# Patient Record
(demographics unavailable — no encounter records)

---

## 2024-10-28 NOTE — SOCIAL HISTORY
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 Pt required 5 month NICU stay with multiple subsequent surgeries for retina/septal defect. Pt did not meet her milestones on time, walking independently at 6yo, and requiring a walker until then, and requiring speech/OT/PT since newborndom. Pt's twin sister has no h/o developmental delays/intellectual disability/neurocognitive disorders [No Known Substance Use] : no known substance use

## 2024-10-28 NOTE — RISK ASSESSMENT
[Unable to determine level of acute suicide risk] : Unable to determine level of acute suicide risk [Unable to determine] : Unable to determine [Not clinically indicated] : Safety Plan completed/updated (for individuals at risk): Not clinically indicated

## 2024-10-28 NOTE — REASON FOR VISIT
[Patient preference] : as per patient preference [Continuing, patient not seen in-person within last 12 months (provide details below)] : Telehealth services are continuing, patient not seen in-person within last 12 months.  [Telehealth (audio & video) - Individual/Group] : This visit was provided via telehealth using real-time 2-way audio visual technology. [Medical Office: ([facility])___] : The provider was located at the medical office in [unfilled]. [Home] : The patient, [unfilled], was located at home, [unfilled], at the time of the visit. [Verbal consent obtained from patient/other participant(s)] : Verbal consent for telehealth/telephonic services obtained from patient/other participant(s) [FreeTextEntry4] : 7268 [FreeTextEntry5] : 2311 [FreeTextEntry2] : 7/20/23 [Patient with collateral] : Patient with collateral  [Mother] : mother [FreeTextEntry1] : Follow-up CC: "..."  The patient presented for OPD follow-up via telehealth, accompanied by her mother, Karlie.

## 2024-10-28 NOTE — PAST MEDICAL HISTORY
[FreeTextEntry1] : -Paradoxical disinhibition (aggression, laughing) with Ativan; \par  -Zyprexa worsened stereotypies as per mom as well as Abilify at higher doses (patient w/ good response to Abilify at lower dose).  \par  -Remeron was helpful initially for appetite stimulation however lost efficacy \par  -Cyproheptadine Rx'ed by Nutritionist also lost efficacy after several weeks and lead to increased sedation and other times increase in agitation. \par  -Risperidone 0.5 mg PO BID resulted in poor symptom response over time, drooling and upward gaze.

## 2024-10-28 NOTE — HISTORY OF PRESENT ILLNESS
[FreeTextEntry1] : Catrina is a 23-year-old [place] American female currently residing with parents at home in [place], [place].  Catrina has PPHx of ASD and Moderate IDD.  She also has PMHx of Cerebral Palsy and is currently on Risperdal 1 mg QAM + 2 mg QHS and Thorazine 25 mg Q6H PRN for agitation.  Catrina's mother, Karlie, is highly involved in her care and advocates for her extensively.  Catrina presents today for follow-up appointment accompanied by her mother.    Catrina's mother reports continued good response to Risperidone as Catrina has now been on the medication for 231 days.  Her behavior has been controlled and she has not required PRN Thorazine in >2 months.  She questions when to give Catrina her medication and she was advised to give doses 12 hours apart to promote consistent serum levels.  Catrina's mother has consulted their OB/GYN, Dr. Caban, about Progesterone birth control formulations to suppress menstruation, but he is not in support of a depot or SQ formulation.  Catrina's weight has remained constant and is still 105 lbs (same as last appointment).  Labs were reviewed from September and Catrina is noted to have hypercholesterolemia (LDL = 186 mg/dL, up from 151 mg/dL in May 2024).  Counseling was provided about Statin medications since Catrina's cholesterol continues to increase despite a healthy diet and lifestyle changes implemented by her mother.  Sialorrhea remains persistent, but Catrina's mother is reluctant to use Atropine drops as directed.  She was again educated on how to use the medication and associated AEs.  She informs Catrina is going to begin at-home speech therapy hopefully within the next 2 weeks.  She has been using 5 mg Melatonin QHS for sleep initiation.  On assessment, Catrina appears well.  She is calm, behaviorally controlled, and smiling.  She waves at the camera, but remains non-verbal during assessment.  Catrina's mother denies any behavioral or safety concerns at this time. [Yes] : yes [Unable to Assess] : unable to assess

## 2024-10-28 NOTE — PLAN
[Yes. details: ___] : Yes, [unfilled] [Medication education provided] : Medication education provided. [Rationale for medication choices, possible risks/precautions, benefits, alternative treatment choices, and consequences of non-treatment discussed] : Rationale for medication choices, possible risks/precautions, benefits, alternative treatment choices, and consequences of non-treatment discussed with patient/family/caregiver  [FreeTextEntry5] : RTC 1 Month  -c/w Risperidone 1 mg QAM + 2 mg QHS -c/w Thorazine 25 mg Q6H PRN - Agitation -c/w Atropine 1% drops SL Q2-4 hours PRN - Sialorrhea  Medication side effect profile reviewed and discussed including tachycardia, dry mouth, constipation, increased appetite, weight gain, sedation, dizziness, change in urinary function, and hypotension.  The possibility of elevated prolactin, galactorrhea, and amenorrhea was discussed with patient's mother.  She acknowledges and demonstrates understanding.

## 2024-10-28 NOTE — DISCUSSION/SUMMARY
[FreeTextEntry1] : Catrina Mart is a 23-year-old female, domiciled at home with mother, father, with pmhx/pphx significant for ASD and cerebral palsy, intellectual disability, no prior IPP admissions or suicide attempts, who presents to [facility] for follow up evaluation.  On intake, pt presented with increasing and more dangerous behavioral outbursts, low frustration tolerance and mood lability/dysregulation and restricted eating.  At time of intake, patient was on Risperidone 0.5 mg qhs, which over time was titrated to 1mg PO BID due to seemingly good response, however, ultimate loss of efficacy and side effects (lethargy, drooling) and thus cross titration w/ Abilify was started due to parents advising some good response to this medication in the past (though sx of EPS when dose reached 10mg).    Due to ongoing agitated and aggressive behavior, Abilify was titrated to 15 mg daily with PRN Thorazine 25 mg Q6H PRN for breakthrough agitation and continued behavioral interventions, which were emphasized to family again.  Given limited improvement with past medication trials, and current significant improvement, it's likely that her social environment has had an impact on her change of status for the positive and will thus continue current interventions.    Patient struggled with weight loss and poor food intake and she was started on Mirtazapine for her weight and anxiolytic benefits.  Megestrol was d/c'ed in the past (2/2 interaction with OCP and blood clot risk). Patient remains on birth control for h/o PMDD-like traits.   Mood stabilizer benefits for treatment options (VPA in particular) were considered and discussed with parents in the past. Risks and benefits of medication, alternatives and option of no medication discussed.  Inconsolable agitation and physical aggression ultimately became an issue and patient was transitioned back to Risperidone 1 mg QAM + 2 mg QHS with good effect.  Mirtazapine was down-tapered and discontinued.  Will continue to guide pharmacotherapy moving forward with use of [employer] reports, which have been scanned into the EMR.

## 2024-10-28 NOTE — PHYSICAL EXAM
[None] : none [FreeTextEntry2] : unable to assess [FreeTextEntry4] : unable to assess [Well groomed] : well groomed [Average] : average [Cooperative] : cooperative [Full] : full [WNL] : within normal limits [MR] : MR [FreeTextEntry8] : "..." [de-identified] : mute [FreeTextEntry6] : Unable to assess [FreeTextEntry7] : Unable to assess [de-identified] : Intellectually disabled [de-identified] : Unable to assess [de-identified] : Unable to assess

## 2024-12-02 NOTE — SOCIAL HISTORY
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 Pt required 5 month NICU stay with multiple subsequent surgeries for retina/septal defect. Pt did not meet her milestones on time, walking independently at 6yo, and requiring a walker until then, and requiring speech/OT/PT since newborndom. Pt's twin sister has no h/o developmental delays/intellectual disability/neurocognitive disorders [No Known Substance Use] : no known substance use

## 2024-12-02 NOTE — REASON FOR VISIT
[Patient preference] : as per patient preference [Continuing, patient not seen in-person within last 12 months (provide details below)] : Telehealth services are continuing, patient not seen in-person within last 12 months.  [Telehealth (audio & video) - Individual/Group] : This visit was provided via telehealth using real-time 2-way audio visual technology. [Medical Office: ([facility])___] : The provider was located at the medical office in [unfilled]. [Home] : The patient, [unfilled], was located at home, [unfilled], at the time of the visit. [Verbal consent obtained from patient/other participant(s)] : Verbal consent for telehealth/telephonic services obtained from patient/other participant(s) [If not patient, verbal consent obtained from parent/guardian/caretaker (name, relationship) ___ with patient assenting] : Verbal consent for telehealth/telephonic services was obtained from parent/guardian/caretaker, [unfilled], with patient assenting. [FreeTextEntry4] : 9658 [FreeTextEntry5] : 8002 [FreeTextEntry2] : 7/20/23 [Patient with collateral] : Patient with collateral  [Mother] : mother [FreeTextEntry1] : Follow-up CC: "..."  The patient presented for OPD follow-up via telehealth, accompanied by her mother, Karlie.

## 2024-12-02 NOTE — HISTORY OF PRESENT ILLNESS
[FreeTextEntry1] : Catrina is a 23-year-old [place] American female currently residing with parents at home in [place], [place].  Catrina has PPHx of ASD and Moderate IDD.  She also has PMHx of Cerebral Palsy and is currently on Risperdal 1 mg QAM + 2 mg QHS and Thorazine 25 mg Q6H PRN for agitation.  Catrina's mother, Karlie, is highly involved in her care and advocates for her extensively.  Catrina presents today for follow-up appointment accompanied by her mother.    Catrina's mother informs that everything has been going well since her last appointment.  Risperdal has continued to show benefit in keeping Catrina calm and "out of crisis."  Catrina's weight at today's appointment is 107 lbs (up from 105 lbs previously) and her mother is pleased with her appetite and overall weight gain.  The topic of routine lab work was again discussed and the treatment team will continue to monitor Garys cholesterol and HbA1c on a quarterly basis during this 1st year of Risperidone therapy.    Upon assessment, Catrina appears well and friendly.  She is behaviorally controlled, smiling, and waves at the camera.  She does not speak during assessment, despite prompts and encouragement from her mother.  Collateral denies any behavioral or safety concerns at this time. [Yes] : yes [Unable to Assess] : unable to assess

## 2024-12-02 NOTE — PHYSICAL EXAM
[None] : none [FreeTextEntry2] : unable to assess [Well groomed] : well groomed [Average] : average [Cooperative] : cooperative [Full] : full [WNL] : within normal limits [MR] : MR [FreeTextEntry8] : "..." [de-identified] : mute [FreeTextEntry6] : Unable to assess [FreeTextEntry7] : Unable to assess [de-identified] : Intellectually disabled [de-identified] : Unable to assess [de-identified] : Unable to assess Siliq Counseling:  I discussed with the patient the risks of Siliq including but not limited to new or worsening depression, suicidal thoughts and behavior, immunosuppression, malignancy, posterior leukoencephalopathy syndrome, and serious infections.  The patient understands that monitoring is required including a PPD at baseline and must alert us or the primary physician if symptoms of infection or other concerning signs are noted. There is also a special program designed to monitor depression which is required with Siliq.

## 2024-12-02 NOTE — DISCUSSION/SUMMARY
[FreeTextEntry1] : Catrina Mart is a 23-year-old female, domiciled at home with mother, father, with pmhx/pphx significant for ASD and cerebral palsy, intellectual disability, no prior IPP admissions or suicide attempts, who presents to [facility] for follow up evaluation.  On intake, pt presented with increasing and more dangerous behavioral outbursts, low frustration tolerance and mood lability/dysregulation and restricted eating.  At time of intake, patient was on Risperidone 0.5 mg qhs, which over time was titrated to 1mg PO BID due to seemingly good response, however, ultimate loss of efficacy and side effects (lethargy, drooling) and thus cross titration w/ Abilify was started due to parents advising some good response to this medication in the past (though sx of EPS when dose reached 10mg).    Due to ongoing agitated and aggressive behavior, Abilify was titrated to 15 mg daily with PRN Thorazine 25 mg Q6H PRN for breakthrough agitation and continued behavioral interventions, which were emphasized to family again.  Given limited improvement with past medication trials, and current significant improvement, it's likely that her social environment has had an impact on her change of status for the positive and will thus continue current interventions.    Patient struggled with weight loss and poor food intake and she was started on Mirtazapine for her weight and anxiolytic benefits.  Megestrol was d/c'ed in the past (2/2 interaction with OCP and blood clot risk). Patient remains on birth control for h/o PMDD-like traits.   Mood stabilizer benefits for treatment options (VPA in particular) were considered and discussed with parents in the past. Risks and benefits of medication, alternatives and option of no medication discussed.  Inconsolable agitation and physical aggression ultimately became an issue and patient was transitioned back to Risperidone 1 mg QAM + 2 mg QHS with good effect.  Mirtazapine was down-tapered and discontinued.  Will continue to guide pharmacotherapy moving forward with use of [employer] reports, which have been scanned into the EMR.

## 2025-01-28 NOTE — DISCUSSION/SUMMARY
[FreeTextEntry1] : Catrina Mart is a 23-year-old female, domiciled at home with mother, father, with pmhx/pphx significant for ASD and cerebral palsy, intellectual disability, no prior IPP admissions or suicide attempts, who presents to [facility] for follow up evaluation.  On intake, pt presented with increasing and more dangerous behavioral outbursts, low frustration tolerance and mood lability/dysregulation and restricted eating.  At time of intake, patient was on Risperidone 0.5 mg qhs, which over time was titrated to 1mg PO BID due to seemingly good response, however, ultimate loss of efficacy and side effects (lethargy, drooling) and thus cross titration w/ Abilify was started due to parents advising some good response to this medication in the past (though sx of EPS when dose reached 10mg).    Due to ongoing agitated and aggressive behavior, Abilify was titrated to 15 mg daily with PRN Thorazine 25 mg Q6H PRN for breakthrough agitation and continued behavioral interventions, which were emphasized to family again.  Given limited improvement with past medication trials, and current significant improvement, it's likely that her social environment has had an impact on her change of status for the positive and will thus continue current interventions.    Patient struggled with weight loss and poor food intake and she was started on Mirtazapine for her weight and anxiolytic benefits.  Megestrol was d/c'ed in the past (2/2 interaction with OCP and blood clot risk). Patient remains on birth control for h/o PMDD-like traits.   Mood stabilizer benefits for treatment options (VPA in particular) were considered and discussed with parents in the past. Risks and benefits of medication, alternatives and option of no medication discussed.  Inconsolable agitation and physical aggression ultimately became an issue and patient was transitioned back to Risperidone 1 mg QAM + 2 mg QHS with good effect.  Mirtazapine was down-tapered and discontinued.  Will continue to guide pharmacotherapy moving forward with use of [employer] reports, which have been scanned into the EMR.

## 2025-01-28 NOTE — HISTORY OF PRESENT ILLNESS
[FreeTextEntry1] : Catrina is a 23-year-old Thai American female currently residing with parents at home in [place], [place].  Catrina has PPHx of ASD and Moderate IDD.  She also has PMHx of Cerebral Palsy and is currently on Risperdal 1 mg QAM + 2 mg QHS.  Catrina's mother, Karlie, is highly involved in her care and advocates for her extensively.  Catrina presents today for follow-up appointment accompanied by her mother.    Catrina's mother reports that everything has been going well since the last appointment. Risperdal continues to be effective in managing Catrina's behavior and preventing crises. Catrina's weight at today's visit is 102 lbs, a decrease from her previous weight of 107 lbs, which her mother attributes to a recent illness that kept her out of school for approximately 1.5 weeks. Routine lab work was reviewed with her mother, and the results will be uploaded to the EMR shortly. LDL and total cholesterol levels have improved, decreasing from 186 and 269 to 160 and 233, respectively. Catrina's HbA1c is 5.7%, which is borderline for diabetes, and will continue to be monitored.  During the assessment, Catrina appeared happy, smiling, laughing, and waving at the camera. She was behaviorally controlled and greeted the writer verbally. Collateral denies any behavioral or safety concerns at this time. [Yes] : yes [Unable to Assess] : unable to assess

## 2025-01-28 NOTE — REASON FOR VISIT
[Patient preference] : as per patient preference [Continuing, patient not seen in-person within last 12 months (provide details below)] : Telehealth services are continuing, patient not seen in-person within last 12 months.  [Telehealth (audio & video) - Individual/Group] : This visit was provided via telehealth using real-time 2-way audio visual technology. [Medical Office: ([facility])___] : The provider was located at the medical office in [unfilled]. [Home] : The patient, [unfilled], was located at home, [unfilled], at the time of the visit. [Patient's space is appropriate for telehealth and maintains privacy/confidentiality.] : Patient's space is appropriate for telehealth and maintains privacy/confidentiality. [Verbal consent obtained from patient/other participant(s)] : Verbal consent for telehealth/telephonic services obtained from patient/other participant(s) [If not patient, verbal consent obtained from parent/guardian/caretaker (name, relationship) ___ with patient assenting] : Verbal consent for telehealth/telephonic services was obtained from parent/guardian/caretaker, [unfilled], with patient assenting. [FreeTextEntry4] : 4346 [FreeTextEntry5] : 5309 [FreeTextEntry2] : 7/20/23 [Patient with collateral] : Patient with collateral  [Mother] : mother [FreeTextEntry1] : Follow-up CC: "Hi!"  The patient presented for OPD follow-up via telehealth, accompanied by her mother, Karlie.

## 2025-01-28 NOTE — SOCIAL HISTORY
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 Pt required 5 month NICU stay with multiple subsequent surgeries for retina/septal defect. Pt did not meet her milestones on time, walking independently at 4yo, and requiring a walker until then, and requiring speech/OT/PT since newborndom. Pt's twin sister has no h/o developmental delays/intellectual disability/neurocognitive disorders [No Known Substance Use] : no known substance use

## 2025-01-28 NOTE — PHYSICAL EXAM
[None] : none [FreeTextEntry2] : unable to assess [Well groomed] : well groomed [Average] : average [Cooperative] : cooperative [Full] : full [Loud] : loud [WNL] : within normal limits [MR] : MR [FreeTextEntry8] : Happy [FreeTextEntry6] : Unable to assess [FreeTextEntry7] : Unable to assess [de-identified] : Intellectually disabled [de-identified] : Unable to assess [de-identified] : Unable to assess

## 2025-03-04 NOTE — REASON FOR VISIT
[Patient preference] : as per patient preference [Continuing, patient not seen in-person within last 12 months (provide details below)] : Telehealth services are continuing, patient not seen in-person within last 12 months.  [Telehealth (audio & video) - Individual/Group] : This visit was provided via telehealth using real-time 2-way audio visual technology. [Medical Office: ([facility])___] : The provider was located at the medical office in [unfilled]. [Home] : The patient, [unfilled], was located at home, [unfilled], at the time of the visit. [Patient's space is appropriate for telehealth and maintains privacy/confidentiality.] : Patient's space is appropriate for telehealth and maintains privacy/confidentiality. [Participant(s) identity verified] : Participant(s) identity verified. [Verbal consent obtained from patient/other participant(s)] : Verbal consent for telehealth/telephonic services obtained from patient/other participant(s) [If not patient, verbal consent obtained from parent/guardian/caretaker (name, relationship) ___ with patient assenting] : Verbal consent for telehealth/telephonic services was obtained from parent/guardian/caretaker, [unfilled], with patient assenting. [FreeTextEntry4] : 1645 [FreeTextEntry5] : 2494 [FreeTextEntry2] : 7/20/23 [Patient with collateral] : Patient with collateral  [Mother] : mother [FreeTextEntry1] : Follow-up CC: "Today is day 355 of Risperidone for Catrina"  The patient presented for OPD follow-up via telehealth, accompanied by her mother, Karlie.

## 2025-03-04 NOTE — HISTORY OF PRESENT ILLNESS
[FreeTextEntry1] : Catrina is a 24-year-old [place] American female currently residing with parents at home in [place], [place].  Catrina has PPHx of ASD and Moderate IDD.  She also has PMHx of Cerebral Palsy and is currently on Risperdal 1 mg QAM + 2 mg QHS.  Catrina's mother, Karlie, is highly involved in her care and advocates for her extensively.  Catrina presents today for follow-up appointment accompanied by her mother.    Catrina's mother reports that she has been doing well since her last follow-up. Risperdal continues to effectively manage her behavior and prevent crises; however, her mother has observed that Catrina's behavior becomes less controlled around 14:00, as her medication is administered early in the morning upon waking. The option of transitioning to a long-acting injectable formulation for more consistent therapeutic coverage was discussed, but Catrina's mother prefers to continue with oral dosing at this time due to the invasive nature of the injection.    At today's visit, Catrina's weight was recorded at 100.5 lbs, a slight decrease from her previous weight of 102 lbs, which her mother attributes to a healthier diet. She also trialed Atropine drops for sialorrhea, which were effective but caused coughing as a side effect, leading to discontinuation of regular use.    Catrina's mother continues to report behavioral challenges related to Catrina's menstrual cycle. Birth control options to eliminate menses were discussed again, though her OB/GYN, Dr. Crespo, appears opposed to this approach. The writer will defer to Dr. Crespo for management of birth control.    Additionally, Catrina's mother shared that her  will be starting transplant and cancer treatment at [facility] in [facility] next month. During this time, she will be caring for him while Catrina's aunt assumes caregiving responsibilities. She was advised that Catrina may struggle with this transition, and potential medication adjustments can be considered if she has difficulty coping.    Catrina is expected to begin occupational therapy soon, though speech therapy has not yet been established.    On assessment, Catrina appeared happy, smiling, and laughing as she returned home from her daily program. She was behaviorally controlled but loud and excited. Collateral reports no current behavioral or safety concerns. [Yes] : yes [Unable to Assess] : unable to assess

## 2025-03-04 NOTE — SOCIAL HISTORY
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 Pt required 5 month NICU stay with multiple subsequent surgeries for retina/septal defect. Pt did not meet her milestones on time, walking independently at 6yo, and requiring a walker until then, and requiring speech/OT/PT since newborndom. Pt's twin sister has no h/o developmental delays/intellectual disability/neurocognitive disorders [No Known Substance Use] : no known substance use

## 2025-03-04 NOTE — PLAN
[Yes. details: ___] : Yes, [unfilled] [Medication education provided] : Medication education provided. [Rationale for medication choices, possible risks/precautions, benefits, alternative treatment choices, and consequences of non-treatment discussed] : Rationale for medication choices, possible risks/precautions, benefits, alternative treatment choices, and consequences of non-treatment discussed with patient/family/caregiver  [FreeTextEntry5] : RTC 6 Weeks  -c/w Risperidone 1 mg QAM + 2 mg QHS -c/w Atropine 1% drops SL Q2-4 hours PRN - Sialorrhea  Medication side effect profile reviewed and discussed including tachycardia, dry mouth, constipation, increased appetite, weight gain, sedation, dizziness, change in urinary function, and hypotension.  The possibility of elevated prolactin, galactorrhea, and amenorrhea was discussed with patient's mother.  She acknowledges and demonstrates understanding.

## 2025-03-04 NOTE — DISCUSSION/SUMMARY
[FreeTextEntry1] : Catrina Mart is a 23-year-old female, domiciled at home with mother, father, with pmhx/pphx significant for ASD and cerebral palsy, intellectual disability, no prior IPP admissions or suicide attempts, who presents to [facility] OPD for follow up evaluation.  On intake, pt presented with increasing and more dangerous behavioral outbursts, low frustration tolerance and mood lability/dysregulation and restricted eating.  At time of intake, patient was on Risperidone 0.5 mg qhs, which over time was titrated to 1mg PO BID due to seemingly good response, however, ultimate loss of efficacy and side effects (lethargy, drooling) and thus cross titration w/ Abilify was started due to parents advising some good response to this medication in the past (though sx of EPS when dose reached 10mg).    Due to ongoing agitated and aggressive behavior, Abilify was titrated to 15 mg daily with PRN Thorazine 25 mg Q6H PRN for breakthrough agitation and continued behavioral interventions, which were emphasized to family again.  Given limited improvement with past medication trials, and current significant improvement, it's likely that her social environment has had an impact on her change of status for the positive and will thus continue current interventions.    Patient struggled with weight loss and poor food intake and she was started on Mirtazapine for her weight and anxiolytic benefits.  Megestrol was d/c'ed in the past (2/2 interaction with OCP and blood clot risk). Patient remains on birth control for h/o PMDD-like traits.   Mood stabilizer benefits for treatment options (VPA in particular) were considered and discussed with parents in the past. Risks and benefits of medication, alternatives and option of no medication discussed.  Inconsolable agitation and physical aggression ultimately became an issue and patient was transitioned back to Risperidone 1 mg QAM + 2 mg QHS with good effect.  Mirtazapine was down-tapered and discontinued.  Will continue to guide pharmacotherapy moving forward with use of [employer] reports, which have been scanned into the EMR.

## 2025-03-04 NOTE — PHYSICAL EXAM
[None] : none [FreeTextEntry2] : unable to assess [Well groomed] : well groomed [Average] : average [Cooperative] : cooperative [Full] : full [Loud] : loud [WNL] : within normal limits [MR] : MR [FreeTextEntry8] : Happy [FreeTextEntry6] : Unable to assess [FreeTextEntry7] : Unable to assess [de-identified] : Intellectually disabled [de-identified] : Unable to assess [de-identified] : Unable to assess

## 2025-04-14 NOTE — PHYSICAL EXAM
[None] : none [FreeTextEntry2] : unable to assess [Well groomed] : well groomed [Average] : average [Cooperative] : cooperative [Full] : full [Loud] : loud [WNL] : within normal limits [MR] : MR [FreeTextEntry8] : Happy [FreeTextEntry6] : Unable to assess [FreeTextEntry7] : Unable to assess [de-identified] : Intellectually disabled [de-identified] : Unable to assess [de-identified] : Unable to assess

## 2025-04-14 NOTE — HISTORY OF PRESENT ILLNESS
[FreeTextEntry1] : Catrina is a 24-year-old Irish American female currently residing with parents at home in [place], [place].  Catrina has PPHx of ASD and Moderate IDD.  She also has PMHx of Cerebral Palsy and is currently on Risperdal 1 mg QAM + 2 mg QHS.  Catrina's mother, Karlie, is highly involved in her care and advocates for her extensively.  Catrina presents today for follow-up appointment accompanied by her mother.    Catrina's mother reports that Catrina has been doing well since her last follow-up. Risperdal pharmacotherapy continues to effectively manage her behavior and Catrina's mother is pleased at the response she has shown to the medication. At today's visit, Catrina's weight is back up to 102 lbs. She informs of an upcoming appointment with her OB/GYN, Dr. Crespo, with regards to Catrina's PMDD. She was also encouraged to retrial Atropine drops for sialorrhea and was instructed to take note of any cough that occurs. Catrina has begun to receive in-home occupational and physical therapy; however, speech therapy has not yet been established.    On assessment, Catrina presented happy, smiling, and laughing. She was behaviorally controlled and spoke with encouragement. Collateral reports no current behavioral or safety concerns at this time. [Yes] : yes [Unable to Assess] : unable to assess

## 2025-04-14 NOTE — DISCUSSION/SUMMARY
[FreeTextEntry1] : Catrina Mart is a 23-year-old female, domiciled at home with mother, father, with pmhx/pphx significant for ASD and cerebral palsy, intellectual disability, no prior IPP admissions or suicide attempts, who presents to [facility] OPD for follow up evaluation.  On intake, pt presented with increasing and more dangerous behavioral outbursts, low frustration tolerance and mood lability/dysregulation and restricted eating.  At time of intake, patient was on Risperidone 0.5 mg qhs, which over time was titrated to 1mg PO BID due to seemingly good response, however, ultimate loss of efficacy and side effects (lethargy, drooling) and thus cross titration w/ Abilify was started due to parents advising some good response to this medication in the past (though sx of EPS when dose reached 10mg).    Due to ongoing agitated and aggressive behavior, Abilify was titrated to 15 mg daily with PRN Thorazine 25 mg Q6H PRN for breakthrough agitation and continued behavioral interventions, which were emphasized to family again.  Given limited improvement with past medication trials, and current significant improvement, it's likely that her social environment has had an impact on her change of status for the positive and will thus continue current interventions.    Patient struggled with weight loss and poor food intake and she was started on Mirtazapine for her weight and anxiolytic benefits.  Megestrol was d/c'ed in the past (2/2 interaction with OCP and blood clot risk). Patient remains on birth control for h/o PMDD-like traits.   Mood stabilizer benefits for treatment options (VPA in particular) were considered and discussed with parents in the past. Risks and benefits of medication, alternatives and option of no medication discussed.  Inconsolable agitation and physical aggression ultimately became an issue and patient was transitioned back to Risperidone 1 mg QAM + 2 mg QHS with good effect.  Mirtazapine was down-tapered and discontinued.  Will continue to guide pharmacotherapy moving forward with use of [employer] reports, which have been scanned into the EMR.

## 2025-04-14 NOTE — REASON FOR VISIT
[Patient preference] : as per patient preference [Continuing, patient not seen in-person within last 12 months (provide details below)] : Telehealth services are continuing, patient not seen in-person within last 12 months.  [Telehealth (audio & video) - Individual/Group] : This visit was provided via telehealth using real-time 2-way audio visual technology. [Medical Office: ([facility])___] : The provider was located at the medical office in [unfilled]. [Home] : The patient, [unfilled], was located at home, [unfilled], at the time of the visit. [Patient's space is appropriate for telehealth and maintains privacy/confidentiality.] : Patient's space is appropriate for telehealth and maintains privacy/confidentiality. [Participant(s) identity verified] : Participant(s) identity verified. [Verbal consent obtained from patient/other participant(s)] : Verbal consent for telehealth/telephonic services obtained from patient/other participant(s) [If not patient, verbal consent obtained from parent/guardian/caretaker (name, relationship) ___ with patient assenting] : Verbal consent for telehealth/telephonic services was obtained from parent/guardian/caretaker, [unfilled], with patient assenting. [FreeTextEntry4] : 0183 [FreeTextEntry5] : 1433 [FreeTextEntry2] : 7/20/23 [Patient with collateral] : Patient with collateral  [Mother] : mother [FreeTextEntry1] : Follow-up CC: "Today is day 398 of Risperidone"  The patient presented for OPD follow-up via telehealth, accompanied by her mother, Karlie.

## 2025-04-14 NOTE — SOCIAL HISTORY
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 Pt required 5 month NICU stay with multiple subsequent surgeries for retina/septal defect. Pt did not meet her milestones on time, walking independently at 6yo, and requiring a walker until then, and requiring speech/OT/PT since newborndom. Pt's twin sister has no h/o developmental delays/intellectual disability/neurocognitive disorders [No Known Substance Use] : no known substance use

## 2025-05-27 NOTE — SOCIAL HISTORY
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 Pt required 5 month NICU stay with multiple subsequent surgeries for retina/septal defect. Pt did not meet her milestones on time, walking independently at 6yo, and requiring a walker until then, and requiring speech/OT/PT since newborndom. Pt's twin sister has no h/o developmental delays/intellectual disability/neurocognitive disorders [No Known Substance Use] : no known substance use

## 2025-05-27 NOTE — PLAN
[Yes. details: ___] : Yes, [unfilled] [Medication education provided] : Medication education provided. [Rationale for medication choices, possible risks/precautions, benefits, alternative treatment choices, and consequences of non-treatment discussed] : Rationale for medication choices, possible risks/precautions, benefits, alternative treatment choices, and consequences of non-treatment discussed with patient/family/caregiver  [FreeTextEntry5] : RTC 4 Weeks  -c/w Risperidone 1 mg QAM + 2 mg QHS -c/w Atropine 1% drops SL Q2-4 hours PRN - Sialorrhea  Medication side effect profile reviewed and discussed including tachycardia, dry mouth, constipation, increased appetite, weight gain, sedation, dizziness, change in urinary function, and hypotension.  The possibility of elevated prolactin, galactorrhea, and amenorrhea was discussed with patient's mother.  She acknowledges and demonstrates understanding.

## 2025-05-27 NOTE — REASON FOR VISIT
[Patient preference] : as per patient preference [Continuing, patient not seen in-person within last 12 months (provide details below)] : Telehealth services are continuing, patient not seen in-person within last 12 months.  [Telehealth (audio & video) - Individual/Group] : This visit was provided via telehealth using real-time 2-way audio visual technology. [Medical Office: ([facility])___] : The provider was located at the medical office in [unfilled]. [Home] : The patient, [unfilled], was located at home, [unfilled], at the time of the visit. [Patient's space is appropriate for telehealth and maintains privacy/confidentiality.] : Patient's space is appropriate for telehealth and maintains privacy/confidentiality. [Participant(s) identity verified] : Participant(s) identity verified. [Verbal consent obtained from patient/other participant(s)] : Verbal consent for telehealth/telephonic services obtained from patient/other participant(s) [If not patient, verbal consent obtained from parent/guardian/caretaker (name, relationship) ___ with patient assenting] : Verbal consent for telehealth/telephonic services was obtained from parent/guardian/caretaker, [unfilled], with patient assenting. [FreeTextEntry4] : 1586 [FreeTextEntry5] : 2755 [FreeTextEntry2] : 7/20/23 [Patient with collateral] : Patient with collateral  [Mother] : mother [FreeTextEntry1] : Follow-up CC: "Catrina has been coping appropriately"  The patient presented for OPD follow-up via telehealth, accompanied by her mother, Karlie.

## 2025-05-27 NOTE — PHYSICAL EXAM
[None] : none [FreeTextEntry2] : unable to assess [Well groomed] : well groomed [Average] : average [Cooperative] : cooperative [Full] : full [WNL] : within normal limits [MR] : MR [FreeTextEntry8] : Happy [de-identified] : Selectively mute [FreeTextEntry6] : Unable to assess [FreeTextEntry7] : Unable to assess [de-identified] : Intellectually disabled [de-identified] : Unable to assess [de-identified] : Unable to assess

## 2025-05-27 NOTE — DISCUSSION/SUMMARY
[FreeTextEntry1] : Catrina Mart is a 23-year-old female, domiciled at home with mother, father, with pmhx/pphx significant for ASD and cerebral palsy, intellectual disability, no prior IPP admissions or suicide attempts, who presents to [facility] OPD for follow up evaluation.  On intake, pt presented with increasing and more dangerous behavioral outbursts, low frustration tolerance and mood lability/dysregulation and restricted eating.  At time of intake, patient was on Risperidone 0.5 mg qhs, which over time was titrated to 1mg PO BID due to seemingly good response, however, ultimate loss of efficacy and side effects (lethargy, drooling) and thus cross titration w/ Abilify was started due to parents advising some good response to this medication in the past (though sx of EPS when dose reached 10mg).    Due to ongoing agitated and aggressive behavior, Abilify was titrated to 15 mg daily with PRN Thorazine 25 mg Q6H PRN for breakthrough agitation and continued behavioral interventions, which were emphasized to family again.  Given limited improvement with past medication trials, and current significant improvement, it's likely that her social environment has had an impact on her change of status for the positive and will thus continue current interventions.    Patient struggled with weight loss and poor food intake and she was started on Mirtazapine for her weight and anxiolytic benefits.  Megestrol was d/c'ed in the past (2/2 interaction with OCP and blood clot risk). Patient remains on birth control for h/o PMDD-like traits.   Mood stabilizer benefits for treatment options (VPA in particular) were considered and discussed with parents in the past. Risks and benefits of medication, alternatives and option of no medication discussed.  Inconsolable agitation and physical aggression ultimately became an issue and patient was transitioned back to Risperidone 1 mg QAM + 2 mg QHS with good effect.  Mirtazapine was down-tapered and discontinued.  Will continue to guide pharmacotherapy moving forward with use of [employer] reports, which have been scanned into the EMR.

## 2025-06-24 NOTE — DISCUSSION/SUMMARY
[FreeTextEntry1] : Catrina Mart is a 23-year-old female, domiciled at home with mother, father, with pmhx/pphx significant for ASD and cerebral palsy, intellectual disability, no prior IPP admissions or suicide attempts, who presents to [facility] OPD for follow up evaluation.  On intake, pt presented with increasing and more dangerous behavioral outbursts, low frustration tolerance and mood lability/dysregulation and restricted eating.  At time of intake, patient was on Risperidone 0.5 mg qhs, which over time was titrated to 1mg PO BID due to seemingly good response, however, ultimate loss of efficacy and side effects (lethargy, drooling) and thus cross titration w/ Abilify was started due to parents advising some good response to this medication in the past (though sx of EPS when dose reached 10mg).    Due to ongoing agitated and aggressive behavior, Abilify was titrated to 15 mg daily with PRN Thorazine 25 mg Q6H PRN for breakthrough agitation and continued behavioral interventions, which were emphasized to family again.  Given limited improvement with past medication trials, and current significant improvement, it's likely that her social environment has had an impact on her change of status for the positive and will thus continue current interventions.    Patient struggled with weight loss and poor food intake and she was started on Mirtazapine for her weight and anxiolytic benefits.  Megestrol was d/c'ed in the past (2/2 interaction with OCP and blood clot risk). Patient remains on birth control for h/o PMDD-like traits.   Mood stabilizer benefits for treatment options (VPA in particular) were considered and discussed with parents in the past. Risks and benefits of medication, alternatives and option of no medication discussed.  Inconsolable agitation and physical aggression ultimately became an issue and patient was transitioned back to Risperidone 1 mg QAM + 2 mg QHS with good effect.  Mirtazapine was down-tapered and discontinued.  Will continue to guide pharmacotherapy moving forward with use of [employer] reports, which have been scanned into the EMR.

## 2025-06-24 NOTE — PLAN
[Yes. details: ___] : Yes, [unfilled] [Medication education provided] : Medication education provided. [Rationale for medication choices, possible risks/precautions, benefits, alternative treatment choices, and consequences of non-treatment discussed] : Rationale for medication choices, possible risks/precautions, benefits, alternative treatment choices, and consequences of non-treatment discussed with patient/family/caregiver  [FreeTextEntry5] : RTC 6 Weeks  -c/w Risperidone 1 mg QAM + 2 mg QHS -restart Chlorpromazine 10 mg daily PRN - Agitation -c/w Atropine 1% drops SL Q2-4 hours PRN - Sialorrhea  Medication side effect profile reviewed and discussed including tachycardia, dry mouth, constipation, increased appetite, weight gain, sedation, dizziness, change in urinary function, and hypotension.  The possibility of elevated prolactin, galactorrhea, and amenorrhea was discussed with patient's mother.  She acknowledges and demonstrates understanding.

## 2025-06-24 NOTE — SOCIAL HISTORY
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 Pt required 5 month NICU stay with multiple subsequent surgeries for retina/septal defect. Pt did not meet her milestones on time, walking independently at 4yo, and requiring a walker until then, and requiring speech/OT/PT since newborndom. Pt's twin sister has no h/o developmental delays/intellectual disability/neurocognitive disorders

## 2025-06-24 NOTE — HISTORY OF PRESENT ILLNESS
[Yes] : yes [Unable to Assess] : unable to assess [FreeTextEntry1] : Catrina is a 24-year-old [place] American female currently residing with parents at home in [place], [place].  Catrina has PPHx of ASD and Moderate IDD.  She also has PMHx of Cerebral Palsy and is currently on Risperdal 1 mg QAM + 2 mg QHS.  Catrina's mother, Karlie, is highly involved in her care and advocates for her extensively.  Catrina presents today for follow-up appointment accompanied by her mother.    Catrina's mother reports that Catrina has been doing well since her last follow-up, considering the ongoing family challenges. She shares that her  recently underwent a bone marrow transplant at [facility] and has been staying in the city while he receives daily evaluations. Catrina's mother has been staying home in [place] to care for Catrina while her other daughter - who is a known historical trigger for Catrina - has been in the city with her .  Despite recent hardships, Catrina is managing well and responds appropriately in most circumstances to redirection when she becomes agitated. Chlorpromazine, which has been used in the past for Catrina's agitation, will be restarted at 10 mg as needed to address behavioral outbursts. Catrina's mother continues to actively search for an assisted living facility; however, [facility] is not a good fit at the present time based on their availabilities and lack of a single occupancy room.  On assessment, Catrina appeared happy and smiling. She was more vocal and was able to exchange basic pleasantries throughout today's examination. She was behaviorally controlled for a majority of the time, but became agitated secondary to hunger toward the end of our meeting. Her mother denies any current behavioral or safety concerns.

## 2025-06-24 NOTE — REASON FOR VISIT
[Patient preference] : as per patient preference [Continuing, patient not seen in-person within last 12 months (provide details below)] : Telehealth services are continuing, patient not seen in-person within last 12 months.  [Telehealth (audio & video) - Individual/Group] : This visit was provided via telehealth using real-time 2-way audio visual technology. [Medical Office: ([facility])___] : The provider was located at the medical office in [unfilled]. [Home] : The patient, [unfilled], was located at home, [unfilled], at the time of the visit. [Patient's space is appropriate for telehealth and maintains privacy/confidentiality.] : Patient's space is appropriate for telehealth and maintains privacy/confidentiality. [Participant(s) identity verified] : Participant(s) identity verified. [Verbal consent obtained from patient/other participant(s)] : Verbal consent for telehealth/telephonic services obtained from patient/other participant(s) [If not patient, verbal consent obtained from parent/guardian/caretaker (name, relationship) ___ with patient assenting] : Verbal consent for telehealth/telephonic services was obtained from parent/guardian/caretaker, [unfilled], with patient assenting. [Patient with collateral] : Patient with collateral  [Mother] : mother [FreeTextEntry4] : 1600 [FreeTextEntry5] : 5219 [FreeTextEntry2] : 7/20/23 [FreeTextEntry1] : Follow-up CC: "Hi!"  The patient presented for [facility] follow-up via telehealth, accompanied by her mother, Karlie.

## 2025-06-24 NOTE — PHYSICAL EXAM
[None] : none [Well groomed] : well groomed [Average] : average [Cooperative] : cooperative [Full] : full [WNL] : within normal limits [MR] : MR [FreeTextEntry2] : unable to assess [Clear] : clear [Loud] : loud [FreeTextEntry8] : Happy [FreeTextEntry6] : Unable to assess [FreeTextEntry7] : Unable to assess [de-identified] : Intellectually disabled [de-identified] : Unable to assess [de-identified] : Unable to assess